# Patient Record
(demographics unavailable — no encounter records)

---

## 2025-06-10 NOTE — PHYSICAL EXAM
[JVD] : no jugular venous distention  [No Rash or Lesion] : No rash or lesion [Normal Breath Sounds] : Normal breath sounds [Alert] : alert [Calm] : calm [de-identified] : Overweight [de-identified] : Normal [de-identified] : Moderately protuberant abdomen; moderate rectus diastasis [de-identified] : Incarcerated recurrent supraumbilical ventral hernia

## 2025-06-10 NOTE — CONSULT LETTER
[Dear  ___] : Dear  [unfilled], [Courtesy Letter:] : I had the pleasure of seeing your patient, [unfilled], in my office today. [Please see my note below.] : Please see my note below. [Consult Closing:] : Thank you very much for allowing me to participate in the care of this patient.  If you have any questions, please do not hesitate to contact me. [FreeTextEntry3] : Respectfully,  Deonte Cardona M.D., FACS

## 2025-06-10 NOTE — ASSESSMENT
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  He often does heavy lifting and strenuous activity at work, but he can avoid it if necessary with some help.  He had a periumbilical hernia repair by another surgeon years ago.  He recently started an exercise regimen at the gym to lose weight.  Physical examination demonstrates a large egg sized tender bulge several fingerbreadths above the umbilicus which is not reducible consistent with an incarcerated recurrent supraumbilical ventral hernia warranting surgical repair.  There is no evidence of strangulation, and the patient denies any symptoms of obstruction.  This hernia is complicated by a moderate to large rectus diastasis likely related to his excess abdominal weight and moderately protuberant abdomen.  His current BMI is 35.  I explained the pros and cons of surgery, as well as all risks, benefits, indications and alternatives of the procedure and the patient understood and agreed.  He would like this done as soon as possible in light of progressively worsening symptoms.  Mack was scheduled for the repair of his incarcerated recurrent supraumbilical ventral hernia with mesh on Friday, June 20, 2025 under LOCAL with IV SEDATION at [facility] with presurgical testing waived.  He was encouraged to avoid heavy lifting and strenuous activity in the interim (especially at work), of course.  We also discussed the importance of calorie restriction, healthy eating, and moderate aerobic exercise with regard to weight loss, hernia recurrence, his rectus diastasis, and his overall health.